# Patient Record
Sex: FEMALE | Race: WHITE | Employment: FULL TIME | URBAN - METROPOLITAN AREA
[De-identification: names, ages, dates, MRNs, and addresses within clinical notes are randomized per-mention and may not be internally consistent; named-entity substitution may affect disease eponyms.]

---

## 2020-12-23 ENCOUNTER — TELEPHONE (OUTPATIENT)
Dept: ORTHOPEDIC SURGERY | Age: 65
End: 2020-12-23

## 2020-12-23 ENCOUNTER — OFFICE VISIT (OUTPATIENT)
Dept: ORTHOPEDIC SURGERY | Age: 65
End: 2020-12-23
Payer: COMMERCIAL

## 2020-12-23 VITALS — BODY MASS INDEX: 23.56 KG/M2 | HEIGHT: 64 IN | TEMPERATURE: 97.1 F | WEIGHT: 138 LBS

## 2020-12-23 PROCEDURE — 99203 OFFICE O/P NEW LOW 30 MIN: CPT | Performed by: ORTHOPAEDIC SURGERY

## 2020-12-23 NOTE — PATIENT INSTRUCTIONS
Impression:   1. Grade 2-3 K-L patellofemoral arthritis of bilateral knees  2. Left knee is currently more symptomatic with pain and loss of extension and flexion compared to the right knee. Plan/Treatment:   1. Treatment options were discussed at some length with David Cornejo. 2.  She is started in physical therapy which has been effective previously for her patellofemoral disease. 3.  She is interested in proceeding with geniculate nerve block and ablation. This will need to be proved to her insurance company. 4.  Return to the office after approval to proceed with geniculate nerve block she if she is in a date for the geniculate nerve ablation procedure. We will likely start with left knee as this is the more symptomatic.         Elizabeth Madsen MD  12/23/2020

## 2020-12-23 NOTE — PROGRESS NOTES
Generalized Laxity:                                                                    Right                                       Left  Swelling mild mild   Effusion  trace trace   Ecchymosis neg neg   Errythemia neg neg   Warmth neg neg   Deformity neg neg   Crepitus neg Mild   Patellar Subluxation Mild lateral  Lateral  Mild    Tenderness  Medial joint/medial lateral patellar facet Medial and lateral joint /medial and lateral patellar facet   Log Roll neg neg   Patellar Apprehension neg neg   Patellar Grind neg Positive    Extension Lag neg neg     Neurovascular Status:   Range of Motion Extension Flexion Pulses (0-4) Dorsalis  Pedis Posterior  Tibialis   Right 0    134      Degrees Right   2+    Left   3   128       Degrees Left 2+                          Test Including Ligamentous Stability/ Positive or Negative                                       Test          Right         Left   Valgus  neg                neg                Varus neg neg   Lachman neg neg   Anterior Drawer neg neg   Posterior Drawer neg neg   Surinder     Pivot Shift     Quadraceps Active     Atrophy                    cm                   cm       X-Ray Findings taken in Office: 4 views of the right knee were read by myself and shows well-maintained tibiofemoral joint space with minimal age-related changes. She has more marked changes at the patellofemoral joint with lateral subluxation and loss of lateral articular cartilage. X-Ray Findings 4 views of the left knee were read by myself and shows similar findings with no loss of the femoral joint space with only minimal age-related changes. She has more marked changes at the patellofemoral joint of the left knee with loss of lateral articular cartilage and lateral subluxation of patella. Impression:   1. Grade 2-3 K-L patellofemoral arthritis of bilateral knees  2.   Left knee is currently more symptomatic with pain and loss of extension and flexion compared to the right knee.        Plan/Treatment:   1. Treatment options were discussed at some length with Yaneli Larkin. 2.  She is started in physical therapy which has been effective previously for her patellofemoral disease. 3.  She is interested in proceeding with geniculate nerve block and ablation. This will need to be proved to her insurance company. 4.  She will return to the office after approval to proceed with geniculate nerve block she if she is approved for the  procedure. We will likely start with left knee as this is the more symptomatic. Arlene Mora MD  12/23/2020    This dictation was done with Dragon dictalexx and may contain mechanical errors related to translation.

## 2021-01-05 ENCOUNTER — TELEPHONE (OUTPATIENT)
Dept: ORTHOPEDIC SURGERY | Age: 66
End: 2021-01-05

## 2021-01-05 NOTE — TELEPHONE ENCOUNTER
CPT: [id number], [id number]  BODY PART: left knee  AUTHORIZATION: denied    Submitted case online with [employer] and submitted online with Ira 1/5/21    Still under review with Ira 1/14/21    Per Ira, nerve test is approved, [employer]  But Coolief is denied.   Will forward this to [facility] for appeal.     Patient does not want to pursue, therapy is helping   1/20/21

## 2021-01-19 ENCOUNTER — TELEPHONE (OUTPATIENT)
Dept: ORTHOPEDIC SURGERY | Age: 66
End: 2021-01-19

## 2021-01-19 NOTE — TELEPHONE ENCOUNTER
Left message telling her the insurance denied the Coolief procedure but I need her permission to go for an appeal.  Asking her to call me back to see if she wants to proceed with appeal.

## 2021-01-20 NOTE — TELEPHONE ENCOUNTER
OTHER:  Patient called back and says she DOES NOT want to go for the appeal because the PT is helping her.